# Patient Record
Sex: MALE | Race: WHITE | NOT HISPANIC OR LATINO | URBAN - METROPOLITAN AREA
[De-identification: names, ages, dates, MRNs, and addresses within clinical notes are randomized per-mention and may not be internally consistent; named-entity substitution may affect disease eponyms.]

---

## 2020-03-25 ENCOUNTER — OUTPATIENT (OUTPATIENT)
Dept: OUTPATIENT SERVICES | Facility: HOSPITAL | Age: 25
LOS: 1 days | End: 2020-03-25
Payer: COMMERCIAL

## 2020-03-25 DIAGNOSIS — R13.14 DYSPHAGIA, PHARYNGOESOPHAGEAL PHASE: ICD-10-CM

## 2020-03-25 DIAGNOSIS — Z00.00 ENCOUNTER FOR GENERAL ADULT MEDICAL EXAMINATION WITHOUT ABNORMAL FINDINGS: ICD-10-CM

## 2020-03-25 PROCEDURE — 88312 SPECIAL STAINS GROUP 1: CPT | Mod: 26

## 2020-03-25 PROCEDURE — 88305 TISSUE EXAM BY PATHOLOGIST: CPT

## 2020-03-25 PROCEDURE — 88312 SPECIAL STAINS GROUP 1: CPT

## 2020-03-25 PROCEDURE — C1726: CPT

## 2020-03-25 PROCEDURE — 88305 TISSUE EXAM BY PATHOLOGIST: CPT | Mod: 26

## 2020-03-25 PROCEDURE — 43239 EGD BIOPSY SINGLE/MULTIPLE: CPT

## 2020-03-26 LAB — SURGICAL PATHOLOGY STUDY: SIGNIFICANT CHANGE UP

## 2020-11-26 ENCOUNTER — TRANSCRIPTION ENCOUNTER (OUTPATIENT)
Age: 25
End: 2020-11-26

## 2022-10-13 ENCOUNTER — INPATIENT (INPATIENT)
Facility: HOSPITAL | Age: 27
LOS: 2 days | Discharge: HOME | DRG: 389 | End: 2022-10-16
Attending: COLON & RECTAL SURGERY | Admitting: SURGERY
Payer: COMMERCIAL

## 2022-10-13 VITALS
OXYGEN SATURATION: 98 % | RESPIRATION RATE: 18 BRPM | TEMPERATURE: 97 F | WEIGHT: 130.07 LBS | HEART RATE: 75 BPM | DIASTOLIC BLOOD PRESSURE: 85 MMHG | SYSTOLIC BLOOD PRESSURE: 123 MMHG | HEIGHT: 70 IN

## 2022-10-13 PROCEDURE — 99285 EMERGENCY DEPT VISIT HI MDM: CPT

## 2022-10-13 NOTE — ED ADULT TRIAGE NOTE - CHIEF COMPLAINT QUOTE
C/o abdominal pain with associated NV since yesterday at 1700. Hx abdominal adhesions previously needing NG tube

## 2022-10-14 DIAGNOSIS — Z98.890 OTHER SPECIFIED POSTPROCEDURAL STATES: Chronic | ICD-10-CM

## 2022-10-14 DIAGNOSIS — K56.609 UNSPECIFIED INTESTINAL OBSTRUCTION, UNSPECIFIED AS TO PARTIAL VERSUS COMPLETE OBSTRUCTION: ICD-10-CM

## 2022-10-14 LAB
ALBUMIN SERPL ELPH-MCNC: 4.5 G/DL — SIGNIFICANT CHANGE UP (ref 3.3–5)
ALP SERPL-CCNC: 58 U/L — SIGNIFICANT CHANGE UP (ref 40–120)
ALT FLD-CCNC: 13 U/L — SIGNIFICANT CHANGE UP (ref 10–45)
ANION GAP SERPL CALC-SCNC: 17 MMOL/L — SIGNIFICANT CHANGE UP (ref 5–17)
AST SERPL-CCNC: 13 U/L — SIGNIFICANT CHANGE UP (ref 10–40)
BASE EXCESS BLDV CALC-SCNC: 2.3 MMOL/L — SIGNIFICANT CHANGE UP (ref -2–3)
BASOPHILS # BLD AUTO: 0.04 K/UL — SIGNIFICANT CHANGE UP (ref 0–0.2)
BASOPHILS NFR BLD AUTO: 0.3 % — SIGNIFICANT CHANGE UP (ref 0–2)
BILIRUB SERPL-MCNC: 0.6 MG/DL — SIGNIFICANT CHANGE UP (ref 0.2–1.2)
BUN SERPL-MCNC: 20 MG/DL — SIGNIFICANT CHANGE UP (ref 7–23)
CA-I SERPL-SCNC: 1.22 MMOL/L — SIGNIFICANT CHANGE UP (ref 1.15–1.33)
CALCIUM SERPL-MCNC: 9.6 MG/DL — SIGNIFICANT CHANGE UP (ref 8.4–10.5)
CHLORIDE BLDV-SCNC: 99 MMOL/L — SIGNIFICANT CHANGE UP (ref 96–108)
CHLORIDE SERPL-SCNC: 101 MMOL/L — SIGNIFICANT CHANGE UP (ref 96–108)
CO2 BLDV-SCNC: 28 MMOL/L — HIGH (ref 22–26)
CO2 SERPL-SCNC: 22 MMOL/L — SIGNIFICANT CHANGE UP (ref 22–31)
CREAT SERPL-MCNC: 1.08 MG/DL — SIGNIFICANT CHANGE UP (ref 0.5–1.3)
EGFR: 96 ML/MIN/1.73M2 — SIGNIFICANT CHANGE UP
EOSINOPHIL # BLD AUTO: 0.1 K/UL — SIGNIFICANT CHANGE UP (ref 0–0.5)
EOSINOPHIL NFR BLD AUTO: 0.7 % — SIGNIFICANT CHANGE UP (ref 0–6)
FLUAV AG NPH QL: SIGNIFICANT CHANGE UP
FLUBV AG NPH QL: SIGNIFICANT CHANGE UP
GAS PNL BLDV: 136 MMOL/L — SIGNIFICANT CHANGE UP (ref 136–145)
GAS PNL BLDV: SIGNIFICANT CHANGE UP
GAS PNL BLDV: SIGNIFICANT CHANGE UP
GLUCOSE BLDV-MCNC: 86 MG/DL — SIGNIFICANT CHANGE UP (ref 70–99)
GLUCOSE SERPL-MCNC: 86 MG/DL — SIGNIFICANT CHANGE UP (ref 70–99)
HCO3 BLDV-SCNC: 26 MMOL/L — SIGNIFICANT CHANGE UP (ref 22–29)
HCT VFR BLD CALC: 47.5 % — SIGNIFICANT CHANGE UP (ref 39–50)
HCT VFR BLDA CALC: 48 % — SIGNIFICANT CHANGE UP (ref 39–51)
HGB BLD CALC-MCNC: 15.9 G/DL — SIGNIFICANT CHANGE UP (ref 12.6–17.4)
HGB BLD-MCNC: 15.5 G/DL — SIGNIFICANT CHANGE UP (ref 13–17)
IMM GRANULOCYTES NFR BLD AUTO: 0.4 % — SIGNIFICANT CHANGE UP (ref 0–0.9)
LACTATE BLDV-MCNC: 1.7 MMOL/L — SIGNIFICANT CHANGE UP (ref 0.5–2)
LIDOCAIN IGE QN: 28 U/L — SIGNIFICANT CHANGE UP (ref 7–60)
LYMPHOCYTES # BLD AUTO: 17.8 % — SIGNIFICANT CHANGE UP (ref 13–44)
LYMPHOCYTES # BLD AUTO: 2.47 K/UL — SIGNIFICANT CHANGE UP (ref 1–3.3)
MAGNESIUM SERPL-MCNC: 1.9 MG/DL — SIGNIFICANT CHANGE UP (ref 1.6–2.6)
MCHC RBC-ENTMCNC: 27.2 PG — SIGNIFICANT CHANGE UP (ref 27–34)
MCHC RBC-ENTMCNC: 32.6 GM/DL — SIGNIFICANT CHANGE UP (ref 32–36)
MCV RBC AUTO: 83.3 FL — SIGNIFICANT CHANGE UP (ref 80–100)
MONOCYTES # BLD AUTO: 1.25 K/UL — HIGH (ref 0–0.9)
MONOCYTES NFR BLD AUTO: 9 % — SIGNIFICANT CHANGE UP (ref 2–14)
NEUTROPHILS # BLD AUTO: 9.95 K/UL — HIGH (ref 1.8–7.4)
NEUTROPHILS NFR BLD AUTO: 71.8 % — SIGNIFICANT CHANGE UP (ref 43–77)
NRBC # BLD: 0 /100 WBCS — SIGNIFICANT CHANGE UP (ref 0–0)
PCO2 BLDV: 39 MMHG — LOW (ref 42–55)
PH BLDV: 7.44 — HIGH (ref 7.32–7.43)
PHOSPHATE SERPL-MCNC: 2.4 MG/DL — LOW (ref 2.5–4.5)
PLATELET # BLD AUTO: 376 K/UL — SIGNIFICANT CHANGE UP (ref 150–400)
PO2 BLDV: 26 MMHG — SIGNIFICANT CHANGE UP (ref 25–45)
POTASSIUM BLDV-SCNC: 3.7 MMOL/L — SIGNIFICANT CHANGE UP (ref 3.5–5.1)
POTASSIUM SERPL-MCNC: 4.1 MMOL/L — SIGNIFICANT CHANGE UP (ref 3.5–5.3)
POTASSIUM SERPL-SCNC: 4.1 MMOL/L — SIGNIFICANT CHANGE UP (ref 3.5–5.3)
PROT SERPL-MCNC: 7.2 G/DL — SIGNIFICANT CHANGE UP (ref 6–8.3)
RBC # BLD: 5.7 M/UL — SIGNIFICANT CHANGE UP (ref 4.2–5.8)
RBC # FLD: 13.2 % — SIGNIFICANT CHANGE UP (ref 10.3–14.5)
RSV RNA NPH QL NAA+NON-PROBE: SIGNIFICANT CHANGE UP
SAO2 % BLDV: 39.1 % — LOW (ref 67–88)
SARS-COV-2 RNA SPEC QL NAA+PROBE: SIGNIFICANT CHANGE UP
SODIUM SERPL-SCNC: 140 MMOL/L — SIGNIFICANT CHANGE UP (ref 135–145)
WBC # BLD: 13.86 K/UL — HIGH (ref 3.8–10.5)
WBC # FLD AUTO: 13.86 K/UL — HIGH (ref 3.8–10.5)

## 2022-10-14 PROCEDURE — 71045 X-RAY EXAM CHEST 1 VIEW: CPT | Mod: 26,77

## 2022-10-14 PROCEDURE — 71045 X-RAY EXAM CHEST 1 VIEW: CPT | Mod: 26

## 2022-10-14 PROCEDURE — 74177 CT ABD & PELVIS W/CONTRAST: CPT | Mod: 26,MA

## 2022-10-14 RX ORDER — ACETAMINOPHEN 500 MG
1000 TABLET ORAL ONCE
Refills: 0 | Status: COMPLETED | OUTPATIENT
Start: 2022-10-14 | End: 2022-10-14

## 2022-10-14 RX ORDER — BENZOCAINE AND MENTHOL 5; 1 G/100ML; G/100ML
1 LIQUID ORAL ONCE
Refills: 0 | Status: COMPLETED | OUTPATIENT
Start: 2022-10-14 | End: 2022-10-14

## 2022-10-14 RX ORDER — PANTOPRAZOLE SODIUM 20 MG/1
40 TABLET, DELAYED RELEASE ORAL DAILY
Refills: 0 | Status: DISCONTINUED | OUTPATIENT
Start: 2022-10-14 | End: 2022-10-16

## 2022-10-14 RX ORDER — PANTOPRAZOLE SODIUM 20 MG/1
40 TABLET, DELAYED RELEASE ORAL ONCE
Refills: 0 | Status: COMPLETED | OUTPATIENT
Start: 2022-10-14 | End: 2022-10-14

## 2022-10-14 RX ORDER — MORPHINE SULFATE 50 MG/1
4 CAPSULE, EXTENDED RELEASE ORAL ONCE
Refills: 0 | Status: DISCONTINUED | OUTPATIENT
Start: 2022-10-14 | End: 2022-10-14

## 2022-10-14 RX ORDER — SODIUM CHLORIDE 9 MG/ML
1000 INJECTION INTRAMUSCULAR; INTRAVENOUS; SUBCUTANEOUS ONCE
Refills: 0 | Status: COMPLETED | OUTPATIENT
Start: 2022-10-14 | End: 2022-10-14

## 2022-10-14 RX ORDER — SODIUM CHLORIDE 9 MG/ML
1000 INJECTION INTRAMUSCULAR; INTRAVENOUS; SUBCUTANEOUS
Refills: 0 | Status: DISCONTINUED | OUTPATIENT
Start: 2022-10-14 | End: 2022-10-14

## 2022-10-14 RX ORDER — LANOLIN ALCOHOL/MO/W.PET/CERES
5 CREAM (GRAM) TOPICAL AT BEDTIME
Refills: 0 | Status: DISCONTINUED | OUTPATIENT
Start: 2022-10-14 | End: 2022-10-16

## 2022-10-14 RX ORDER — LIDOCAINE 4 G/100G
10 CREAM TOPICAL ONCE
Refills: 0 | Status: COMPLETED | OUTPATIENT
Start: 2022-10-14 | End: 2022-10-14

## 2022-10-14 RX ORDER — BENZOCAINE AND MENTHOL 5; 1 G/100ML; G/100ML
1 LIQUID ORAL THREE TIMES A DAY
Refills: 0 | Status: DISCONTINUED | OUTPATIENT
Start: 2022-10-14 | End: 2022-10-16

## 2022-10-14 RX ORDER — PANTOPRAZOLE SODIUM 20 MG/1
1 TABLET, DELAYED RELEASE ORAL
Qty: 0 | Refills: 0 | DISCHARGE

## 2022-10-14 RX ORDER — SODIUM CHLORIDE 9 MG/ML
1000 INJECTION, SOLUTION INTRAVENOUS
Refills: 0 | Status: DISCONTINUED | OUTPATIENT
Start: 2022-10-14 | End: 2022-10-15

## 2022-10-14 RX ORDER — ONDANSETRON 8 MG/1
4 TABLET, FILM COATED ORAL ONCE
Refills: 0 | Status: COMPLETED | OUTPATIENT
Start: 2022-10-14 | End: 2022-10-14

## 2022-10-14 RX ORDER — INFLUENZA VIRUS VACCINE 15; 15; 15; 15 UG/.5ML; UG/.5ML; UG/.5ML; UG/.5ML
0.5 SUSPENSION INTRAMUSCULAR ONCE
Refills: 0 | Status: COMPLETED | OUTPATIENT
Start: 2022-10-14 | End: 2022-10-16

## 2022-10-14 RX ORDER — ENOXAPARIN SODIUM 100 MG/ML
40 INJECTION SUBCUTANEOUS EVERY 24 HOURS
Refills: 0 | Status: DISCONTINUED | OUTPATIENT
Start: 2022-10-14 | End: 2022-10-16

## 2022-10-14 RX ADMIN — Medication 400 MILLIGRAM(S): at 20:21

## 2022-10-14 RX ADMIN — LIDOCAINE 10 MILLILITER(S): 4 CREAM TOPICAL at 03:56

## 2022-10-14 RX ADMIN — BENZOCAINE AND MENTHOL 1 LOZENGE: 5; 1 LIQUID ORAL at 22:38

## 2022-10-14 RX ADMIN — MORPHINE SULFATE 4 MILLIGRAM(S): 50 CAPSULE, EXTENDED RELEASE ORAL at 04:12

## 2022-10-14 RX ADMIN — MORPHINE SULFATE 4 MILLIGRAM(S): 50 CAPSULE, EXTENDED RELEASE ORAL at 06:55

## 2022-10-14 RX ADMIN — ONDANSETRON 4 MILLIGRAM(S): 8 TABLET, FILM COATED ORAL at 00:13

## 2022-10-14 RX ADMIN — Medication 63.75 MILLIMOLE(S): at 06:28

## 2022-10-14 RX ADMIN — MORPHINE SULFATE 4 MILLIGRAM(S): 50 CAPSULE, EXTENDED RELEASE ORAL at 03:57

## 2022-10-14 RX ADMIN — MORPHINE SULFATE 4 MILLIGRAM(S): 50 CAPSULE, EXTENDED RELEASE ORAL at 07:45

## 2022-10-14 RX ADMIN — PANTOPRAZOLE SODIUM 40 MILLIGRAM(S): 20 TABLET, DELAYED RELEASE ORAL at 12:00

## 2022-10-14 RX ADMIN — BENZOCAINE AND MENTHOL 1 LOZENGE: 5; 1 LIQUID ORAL at 04:15

## 2022-10-14 RX ADMIN — SODIUM CHLORIDE 1000 MILLILITER(S): 9 INJECTION INTRAMUSCULAR; INTRAVENOUS; SUBCUTANEOUS at 00:13

## 2022-10-14 RX ADMIN — MORPHINE SULFATE 4 MILLIGRAM(S): 50 CAPSULE, EXTENDED RELEASE ORAL at 09:23

## 2022-10-14 RX ADMIN — PANTOPRAZOLE SODIUM 40 MILLIGRAM(S): 20 TABLET, DELAYED RELEASE ORAL at 04:45

## 2022-10-14 RX ADMIN — Medication 5 MILLIGRAM(S): at 22:38

## 2022-10-14 RX ADMIN — Medication 400 MILLIGRAM(S): at 14:22

## 2022-10-14 RX ADMIN — SODIUM CHLORIDE 100 MILLILITER(S): 9 INJECTION, SOLUTION INTRAVENOUS at 20:21

## 2022-10-14 RX ADMIN — MORPHINE SULFATE 4 MILLIGRAM(S): 50 CAPSULE, EXTENDED RELEASE ORAL at 00:21

## 2022-10-14 RX ADMIN — Medication 1000 MILLIGRAM(S): at 20:50

## 2022-10-14 RX ADMIN — SODIUM CHLORIDE 125 MILLILITER(S): 9 INJECTION INTRAMUSCULAR; INTRAVENOUS; SUBCUTANEOUS at 02:07

## 2022-10-14 RX ADMIN — SODIUM CHLORIDE 100 MILLILITER(S): 9 INJECTION, SOLUTION INTRAVENOUS at 14:24

## 2022-10-14 NOTE — PROGRESS NOTE ADULT - ASSESSMENT
27M PMHx of eosinophilic esophagitis on PPI, bicuspid aortic valve, bl inguinal hernia repair in childhood, hx of recurrent SBOs treated non-op presents with N/V and severe abd pain for 2 days found to have SBO.     - NGT/IVF/NPO   - no pain medications before exam  - VTE ppx  - obtain records from [facility]   [id number]

## 2022-10-14 NOTE — CHART NOTE - NSCHARTNOTEFT_GEN_A_CORE
Provider was paged because patient was complaining of pain    On arrival, patient was sitting up in his bed. He was complaining of mild abdominal pain, moderate nasal pain (associated w/ the NG tube), nausea, and that the NG tube might have slipped out.    Physical exam:   General: A&Ox3, NAD  HEENT: mask on, NG tube movement caused pain  Respiratory: unlabored breathing  Abdominal: soft, nondistended, nontender to palpation, no rebound or guarding, NG tube drain with 50cc of green fluid (statlock wasn't locked on tube)      Plan:  - 1g of IV tylenol  - 4mg zofran  - 5mg of melatonin  - CXR to check tube placement    [facility]  [id number]

## 2022-10-14 NOTE — H&P ADULT - HISTORY OF PRESENT ILLNESS
27M PMHx of eosinophilic esophagitis on PPI, bicuspid aortic valve, bl inguinal hernia repair in childhood, hx of recurrent SBOs treated non-op presents with N/V and severe abd pain for 2 days. Pt reports that pain started 2 days ago along with N/V, last time passed gas a day ago and had yesterday. Denies fevers, chills, CP, SOB. Per pt, he has a history of recurrent SBOs  about 3-4 years ago, SBO occured verye 2-3 months and at that time there was a plan for MARY at [place], but pt never followed up. For esophagitis, pt is taking PPI and last EGD had 2 years ago, doesn't follow up with GI on regular basis.     In ED WBC 14, Lactate 1.7 HDS, CT is c/w SBO.  27M PMHx of eosinophilic esophagitis on PPI, bicuspid aortic valve, bl inguinal hernia repair in childhood, hx of recurrent SBOs treated non-op presents with N/V and severe periumbilical abd pain for 2 days. Pt reports that pain started 2 days ago along with N/V, last time passed gas a day ago and had yesterday. Denies fevers, chills, CP, SOB. Per pt, he has a history of recurrent SBOs  about 3-4 years ago, SBO occured every 2-3 months and at that time there was a plan for MARY at [place], but pt never followed up. For esophagitis, pt is taking PPI and last EGD had 2 years ago, doesn't follow up with GI on regular basis.     In ED WBC 14, Lactate 1.7 HDS, CT is c/w SBO.

## 2022-10-14 NOTE — H&P ADULT - ATTENDING COMMENTS
Pt seen and examined with surgery team, agree with above.    Pt with h/o bilateral inguinal hernia repair, eosinophilic esophagitis, bicuspid aortic valve and primary SBO, which resolved spontaneously (at [place]) presents with abdominal pain as noted above. Patient indicated to me that he only had one SBO in the past which resolved without surgery, but has indicated to surgical team that he had multiple recurrent episodes. Will verify and obtain records from [place].    Likely adhesive primary SBO, recurrent:  - Will obtain records from [place], preferably including imaging to compare location of obstruction  - If similar location, after this SBO has resolved, may discuss elective laparoscopic lysis of adhesions, but for now will treat as adhesive SBO. No concerning signs indicating need for emergent surgery this morning when I saw pt. I personally reviewed CT images which showed an SBO with prox SB fecalization in lower anterior mid-abdomen.  - Admit, NPO, IVF, NGT  - Serial abdominal exams  - Assess for resolution  - D/w Dr. Horan - may consider CT enterography when resolves to ensure no mass lesion is present    Eosinophilic esophagitis:  - Home med: Protonix  - Continue

## 2022-10-14 NOTE — H&P ADULT - NSICDXPASTMEDICALHX_GEN_ALL_CORE_FT
PAST MEDICAL HISTORY:  Bicuspid aortic valve     Eosinophilic esophagitis     SBO (small bowel obstruction)

## 2022-10-14 NOTE — ED PROVIDER NOTE - CLINICAL SUMMARY MEDICAL DECISION MAKING FREE TEXT BOX
Zane Hammer (MD): Concern for partial SBO. Will obtain labs, CT scan, give fluids, pain control and reassess.

## 2022-10-14 NOTE — ED ADULT NURSE NOTE - OBJECTIVE STATEMENT
27 y old male with PMH of eosinophilic esophagitis, abdominal adhesion and bicuspid aortic valve presents to the ED from home c/o nausea, vomiting and abdominal pain x 1 day. Pt states abdominal pain is intertmittent and generalized. Pt reports decreased PO intake and decreased flatus. Pt A&O x 4, ambulatory. Respirations nonlabored on RA. Abdomen soft, nondistended and tender on palpation. Skin warm, dry and intact. Bed locked in lowest position, side rails up and call bell in reach.

## 2022-10-14 NOTE — ED PROVIDER NOTE - PROGRESS NOTE DETAILS
Dr. Hammer Note: ct scan showing SBO, surgical consult obtained, appreciate consult, surgical team placed NGT and stable for admission.

## 2022-10-14 NOTE — PATIENT PROFILE ADULT - FALL HARM RISK - UNIVERSAL INTERVENTIONS
Bed in lowest position, wheels locked, appropriate side rails in place/Call bell, personal items and telephone in reach/Instruct patient to call for assistance before getting out of bed or chair/Non-slip footwear when patient is out of bed/[place] to call system/Physically safe environment - no spills, clutter or unnecessary equipment/Purposeful Proactive Rounding/Room/bathroom lighting operational, light cord in reach

## 2022-10-14 NOTE — H&P ADULT - ASSESSMENT
27M PMHx of eosinophilic esophagitis on PPI, bicuspid aortic valve, bl inguinal hernia repair in childhood, hx of recurrent SBOs treated non-op presents with N/V and severe abd pain for 2 days found to have SBO.     - Admit to Dr. Henderson   - NPO/IVF  - NGT   - Pain control   - Protonix   - DVT ppx   - Primary team will obtain records from [place]    D/w dr. Henderson     [facility] [id number]

## 2022-10-14 NOTE — H&P ADULT - NSHPLABSRESULTS_GEN_ALL_CORE
.  LABS:                         15.5   13.86 )-----------( 376      ( 14 Oct 2022 00:26 )             47.5     10-14    140  |  101  |  20  ----------------------------<  86  4.1   |  22  |  1.08    Ca    9.6      14 Oct 2022 00:26  Phos  2.4     10-14  Mg     1.9     10-14    TPro  7.2  /  Alb  4.5  /  TBili  0.6  /  DBili  x   /  AST  13  /  ALT  13  /  AlkPhos  58  10-14    < from: CT Abdomen and Pelvis w/ IV Cont (10.14.22 @ 01:32) >    COMPARISON: None.    CONTRAST/COMPLICATIONS:  IV Contrast: Omnipaque 350  90 cc administered   0 cc discarded  Oral Contrast: NONE  Complications: None reported at time of study completion    PROCEDURE:  CT of the Abdomen and Pelvis was performed.  Sagittal and coronal reformats were performed.    FINDINGS:  LOWER CHEST: Within normal limits.    LIVER: Within normal limits.  BILE DUCTS: Normal caliber.  GALLBLADDER: Within normal limits.  SPLEEN: Within normal limits.  PANCREAS: Within normal limits.  ADRENALS: Within normal limits.  KIDNEYS/URETERS: Symmetric enhancement. No hydronephrosis. Right renal   cyst.    BLADDER: Within normal limits.  REPRODUCTIVE ORGANS: Prostate within normal limits.    BOWEL: Dilated loops of small bowel with fecalization and a transition   point in the anteromedial right hemiabdomen, likely secondary to   adhesion. (2:67, 602:25). Mild interloop mesenteric edema. Appendix is   normal.  PERITONEUM: Numerous subcentimeter mesenteric lymph nodes, nonspecific.   Trace to small amount of abdominal and pelvic ascites.  VESSELS: Within normal limits.  RETROPERITONEUM/LYMPH NODES: No lymphadenopathy.  ABDOMINAL WALL: Within normal limits.  BONES: Within normal limits.    IMPRESSION:    Small bowel obstruction with transition point in the right anteromedial   hemiabdomen likely secondary to adhesions. Mild interloop mesenteric   edema and ascites without definite signs of ischemia at this time.

## 2022-10-14 NOTE — H&P ADULT - NSHPPHYSICALEXAM_GEN_ALL_CORE
GEN: NAD  Res: nonlabored on room air   Abd: soft, mildly distended, tender in right periumbilical area w/ rebound tenderness, no diffuse peritonitis, no guarding   Ext: warm, well perfused GEN: NAD  Eyes: no scleral icterus  HENT: NGT in place with clear output, atraumatic  Res: nonlabored on room air with bilateral chest rise  Abd: soft, mildly distended, tender in right periumbilical area, no diffuse peritonitis, no guarding, well-healed bilateral inguinal hernia incisions  Ext: warm, well perfused  Neuro: A&O x3, GCS 15, moves all extremities  Psych: normal affect  Skin: no rashes or lacerations

## 2022-10-14 NOTE — ED PROVIDER NOTE - OBJECTIVE STATEMENT
27y M here w/ mother and PMHx of eosinophilic esophagitis, bicuspid aortic valve presents to the ED c/o N/V and severe abd pain x2days a/w small amount of flatus this morning only and decreased PO intake x2days. Pain is described as colicky. No fevers, CP, SOB. Pt w/ similar prior episode 1 year ago w/ partial SBO that was resolved w/ a NG tube. Denies smoking, ETOH use.

## 2022-10-15 LAB
ANION GAP SERPL CALC-SCNC: 16 MMOL/L — SIGNIFICANT CHANGE UP (ref 5–17)
BUN SERPL-MCNC: 12 MG/DL — SIGNIFICANT CHANGE UP (ref 7–23)
CALCIUM SERPL-MCNC: 8.9 MG/DL — SIGNIFICANT CHANGE UP (ref 8.4–10.5)
CHLORIDE SERPL-SCNC: 102 MMOL/L — SIGNIFICANT CHANGE UP (ref 96–108)
CO2 SERPL-SCNC: 22 MMOL/L — SIGNIFICANT CHANGE UP (ref 22–31)
CREAT SERPL-MCNC: 0.86 MG/DL — SIGNIFICANT CHANGE UP (ref 0.5–1.3)
EGFR: 122 ML/MIN/1.73M2 — SIGNIFICANT CHANGE UP
GLUCOSE SERPL-MCNC: 60 MG/DL — LOW (ref 70–99)
HCT VFR BLD CALC: 42.3 % — SIGNIFICANT CHANGE UP (ref 39–50)
HGB BLD-MCNC: 13.5 G/DL — SIGNIFICANT CHANGE UP (ref 13–17)
MAGNESIUM SERPL-MCNC: 1.6 MG/DL — SIGNIFICANT CHANGE UP (ref 1.6–2.6)
MCHC RBC-ENTMCNC: 27.2 PG — SIGNIFICANT CHANGE UP (ref 27–34)
MCHC RBC-ENTMCNC: 31.9 GM/DL — LOW (ref 32–36)
MCV RBC AUTO: 85.3 FL — SIGNIFICANT CHANGE UP (ref 80–100)
NRBC # BLD: 0 /100 WBCS — SIGNIFICANT CHANGE UP (ref 0–0)
PHOSPHATE SERPL-MCNC: 3.4 MG/DL — SIGNIFICANT CHANGE UP (ref 2.5–4.5)
PLATELET # BLD AUTO: 307 K/UL — SIGNIFICANT CHANGE UP (ref 150–400)
POTASSIUM SERPL-MCNC: 4 MMOL/L — SIGNIFICANT CHANGE UP (ref 3.5–5.3)
POTASSIUM SERPL-SCNC: 4 MMOL/L — SIGNIFICANT CHANGE UP (ref 3.5–5.3)
RBC # BLD: 4.96 M/UL — SIGNIFICANT CHANGE UP (ref 4.2–5.8)
RBC # FLD: 13.2 % — SIGNIFICANT CHANGE UP (ref 10.3–14.5)
SODIUM SERPL-SCNC: 140 MMOL/L — SIGNIFICANT CHANGE UP (ref 135–145)
WBC # BLD: 8.64 K/UL — SIGNIFICANT CHANGE UP (ref 3.8–10.5)
WBC # FLD AUTO: 8.64 K/UL — SIGNIFICANT CHANGE UP (ref 3.8–10.5)

## 2022-10-15 RX ORDER — ACETAMINOPHEN 500 MG
1000 TABLET ORAL ONCE
Refills: 0 | Status: COMPLETED | OUTPATIENT
Start: 2022-10-15 | End: 2022-10-15

## 2022-10-15 RX ORDER — MAGNESIUM SULFATE 500 MG/ML
2 VIAL (ML) INJECTION ONCE
Refills: 0 | Status: COMPLETED | OUTPATIENT
Start: 2022-10-15 | End: 2022-10-16

## 2022-10-15 RX ADMIN — BENZOCAINE AND MENTHOL 1 LOZENGE: 5; 1 LIQUID ORAL at 15:06

## 2022-10-15 RX ADMIN — Medication 400 MILLIGRAM(S): at 05:53

## 2022-10-15 RX ADMIN — BENZOCAINE AND MENTHOL 1 LOZENGE: 5; 1 LIQUID ORAL at 08:44

## 2022-10-15 RX ADMIN — Medication 1000 MILLIGRAM(S): at 06:23

## 2022-10-15 RX ADMIN — BENZOCAINE AND MENTHOL 1 LOZENGE: 5; 1 LIQUID ORAL at 21:31

## 2022-10-15 RX ADMIN — PANTOPRAZOLE SODIUM 40 MILLIGRAM(S): 20 TABLET, DELAYED RELEASE ORAL at 12:45

## 2022-10-15 NOTE — PROGRESS NOTE ADULT - SUBJECTIVE AND OBJECTIVE BOX
Overnight events:   - Patient reported pain, examination was benign; given IV tylenol    SUBJECTIVE:  Patient was seen and examined on AM rounds.    OBJECTIVE:  Vital Signs Last 24 Hrs  T(C): 36.4 (14 Oct 2022 21:04), Max: 36.9 (14 Oct 2022 14:33)  T(F): 97.5 (14 Oct 2022 21:04), Max: 98.5 (14 Oct 2022 14:33)  HR: 72 (14 Oct 2022 21:04) (61 - 85)  BP: 103/66 (14 Oct 2022 21:04) (103/66 - 120/75)  BP(mean): 79 (14 Oct 2022 14:33) (79 - 79)  RR: 18 (14 Oct 2022 21:04) (15 - 18)  SpO2: 97% (14 Oct 2022 21:04) (97% - 100%)    Parameters below as of 14 Oct 2022 21:04  Patient On (Oxygen Delivery Method): room air          10-14-22 @ 07:01  -  10-15-22 @ 00:52  --------------------------------------------------------  IN: 0 mL / OUT: 150 mL / NET: -150 mL        Physical Examination:  General: A&Ox3, NAD  Respiratory: unlabored breathing  Abdominal: soft, nondistended, nontender to palpation, no rebound or guarding, NG tube drain with clear green fluid  Extremities: WWP, [place]      LABS:                        15.5   13.86 )-----------( 376      ( 14 Oct 2022 00:26 )             47.5       10-14    140  |  101  |  20  ----------------------------<  86  4.1   |  22  |  1.08    Ca    9.6      14 Oct 2022 00:26  Phos  2.4     10-14  Mg     1.9     10-14    TPro  7.2  /  Alb  4.5  /  TBili  0.6  /  DBili  x   /  AST  13  /  ALT  13  /  AlkPhos  58  10-14

## 2022-10-15 NOTE — PROGRESS NOTE ADULT - ASSESSMENT
27M PMHx of eosinophilic esophagitis on PPI, bicuspid aortic valve, bl inguinal hernia repair in childhood, hx of recurrent SBOs treated non-op presents with N/V and severe abd pain for 2 days found to have SBO.     Plan:  - NGT/IVF/NPO   - no pain medications before exam  - VTE ppx  - obtain records from [facility]  [id number]   27M PMHx of eosinophilic esophagitis on PPI, bicuspid aortic valve, bl inguinal hernia repair in childhood, hx of recurrent SBOs treated non-op presents with N/V and severe abd pain for 2 days found to have SBO.     Plan:  - Clears  - D/C planning  - Pt will need SB study such as MR enterography and eventual diagnostic lap given hx of recurrent SBO    [facility]  [id number]   27M PMHx of eosinophilic esophagitis on PPI, bicuspid aortic valve, bl inguinal hernia repair in childhood, hx of recurrent SBOs treated non-op presents with N/V and severe abd pain for 2 days found to have SBO.     Plan:  - Clears  - D/C planning  - Pt will need SB study such as MR enterography if he never had and eventual diagnostic lap given hx of recurrent SBO    [facility]  [id number]   27M PMHx of eosinophilic esophagitis on PPI, bicuspid aortic valve, bl inguinal hernia repair in childhood, hx of recurrent SBOs treated non-op presents with N/V and severe abd pain for 2 days found to have SBO.     Plan:  - Clears, if tolerates today adv for LRD for breakfast rashad  - D/C planning  - Pt will need SB study such as MR enterography if he never had and eventual diagnostic lap given hx of recurrent SBO    [facility]  [id number]

## 2022-10-16 ENCOUNTER — TRANSCRIPTION ENCOUNTER (OUTPATIENT)
Age: 27
End: 2022-10-16

## 2022-10-16 VITALS
TEMPERATURE: 98 F | OXYGEN SATURATION: 96 % | SYSTOLIC BLOOD PRESSURE: 119 MMHG | RESPIRATION RATE: 18 BRPM | HEART RATE: 65 BPM | DIASTOLIC BLOOD PRESSURE: 79 MMHG

## 2022-10-16 LAB
ANION GAP SERPL CALC-SCNC: 13 MMOL/L — SIGNIFICANT CHANGE UP (ref 5–17)
BUN SERPL-MCNC: 8 MG/DL — SIGNIFICANT CHANGE UP (ref 7–23)
CALCIUM SERPL-MCNC: 9.5 MG/DL — SIGNIFICANT CHANGE UP (ref 8.4–10.5)
CHLORIDE SERPL-SCNC: 100 MMOL/L — SIGNIFICANT CHANGE UP (ref 96–108)
CO2 SERPL-SCNC: 26 MMOL/L — SIGNIFICANT CHANGE UP (ref 22–31)
CREAT SERPL-MCNC: 0.91 MG/DL — SIGNIFICANT CHANGE UP (ref 0.5–1.3)
EGFR: 118 ML/MIN/1.73M2 — SIGNIFICANT CHANGE UP
GLUCOSE SERPL-MCNC: 70 MG/DL — SIGNIFICANT CHANGE UP (ref 70–99)
HCT VFR BLD CALC: 42.2 % — SIGNIFICANT CHANGE UP (ref 39–50)
HGB BLD-MCNC: 13.7 G/DL — SIGNIFICANT CHANGE UP (ref 13–17)
MAGNESIUM SERPL-MCNC: 2.6 MG/DL — SIGNIFICANT CHANGE UP (ref 1.6–2.6)
MCHC RBC-ENTMCNC: 27 PG — SIGNIFICANT CHANGE UP (ref 27–34)
MCHC RBC-ENTMCNC: 32.5 GM/DL — SIGNIFICANT CHANGE UP (ref 32–36)
MCV RBC AUTO: 83.1 FL — SIGNIFICANT CHANGE UP (ref 80–100)
NRBC # BLD: 0 /100 WBCS — SIGNIFICANT CHANGE UP (ref 0–0)
PHOSPHATE SERPL-MCNC: 3.4 MG/DL — SIGNIFICANT CHANGE UP (ref 2.5–4.5)
PLATELET # BLD AUTO: 334 K/UL — SIGNIFICANT CHANGE UP (ref 150–400)
POTASSIUM SERPL-MCNC: 3.8 MMOL/L — SIGNIFICANT CHANGE UP (ref 3.5–5.3)
POTASSIUM SERPL-SCNC: 3.8 MMOL/L — SIGNIFICANT CHANGE UP (ref 3.5–5.3)
RBC # BLD: 5.08 M/UL — SIGNIFICANT CHANGE UP (ref 4.2–5.8)
RBC # FLD: 12.8 % — SIGNIFICANT CHANGE UP (ref 10.3–14.5)
SODIUM SERPL-SCNC: 139 MMOL/L — SIGNIFICANT CHANGE UP (ref 135–145)
WBC # BLD: 8.18 K/UL — SIGNIFICANT CHANGE UP (ref 3.8–10.5)
WBC # FLD AUTO: 8.18 K/UL — SIGNIFICANT CHANGE UP (ref 3.8–10.5)

## 2022-10-16 PROCEDURE — 82947 ASSAY GLUCOSE BLOOD QUANT: CPT

## 2022-10-16 PROCEDURE — 83605 ASSAY OF LACTIC ACID: CPT

## 2022-10-16 PROCEDURE — 80048 BASIC METABOLIC PNL TOTAL CA: CPT

## 2022-10-16 PROCEDURE — 96375 TX/PRO/DX INJ NEW DRUG ADDON: CPT

## 2022-10-16 PROCEDURE — 74177 CT ABD & PELVIS W/CONTRAST: CPT | Mod: MA

## 2022-10-16 PROCEDURE — 96376 TX/PRO/DX INJ SAME DRUG ADON: CPT

## 2022-10-16 PROCEDURE — 71045 X-RAY EXAM CHEST 1 VIEW: CPT

## 2022-10-16 PROCEDURE — 90686 IIV4 VACC NO PRSV 0.5 ML IM: CPT

## 2022-10-16 PROCEDURE — 83690 ASSAY OF LIPASE: CPT

## 2022-10-16 PROCEDURE — 85014 HEMATOCRIT: CPT

## 2022-10-16 PROCEDURE — 82435 ASSAY OF BLOOD CHLORIDE: CPT

## 2022-10-16 PROCEDURE — 84132 ASSAY OF SERUM POTASSIUM: CPT

## 2022-10-16 PROCEDURE — 83735 ASSAY OF MAGNESIUM: CPT

## 2022-10-16 PROCEDURE — 80053 COMPREHEN METABOLIC PANEL: CPT

## 2022-10-16 PROCEDURE — 84100 ASSAY OF PHOSPHORUS: CPT

## 2022-10-16 PROCEDURE — 36415 COLL VENOUS BLD VENIPUNCTURE: CPT

## 2022-10-16 PROCEDURE — 85027 COMPLETE CBC AUTOMATED: CPT

## 2022-10-16 PROCEDURE — 87637 SARSCOV2&INF A&B&RSV AMP PRB: CPT

## 2022-10-16 PROCEDURE — 96374 THER/PROPH/DIAG INJ IV PUSH: CPT

## 2022-10-16 PROCEDURE — 82330 ASSAY OF CALCIUM: CPT

## 2022-10-16 PROCEDURE — 85025 COMPLETE CBC W/AUTO DIFF WBC: CPT

## 2022-10-16 PROCEDURE — 82803 BLOOD GASES ANY COMBINATION: CPT

## 2022-10-16 PROCEDURE — 84295 ASSAY OF SERUM SODIUM: CPT

## 2022-10-16 PROCEDURE — 99285 EMERGENCY DEPT VISIT HI MDM: CPT

## 2022-10-16 PROCEDURE — 85018 HEMOGLOBIN: CPT

## 2022-10-16 RX ORDER — POTASSIUM CHLORIDE 20 MEQ
20 PACKET (EA) ORAL ONCE
Refills: 0 | Status: COMPLETED | OUTPATIENT
Start: 2022-10-16 | End: 2022-10-16

## 2022-10-16 RX ADMIN — PANTOPRAZOLE SODIUM 40 MILLIGRAM(S): 20 TABLET, DELAYED RELEASE ORAL at 11:41

## 2022-10-16 RX ADMIN — Medication 25 GRAM(S): at 03:24

## 2022-10-16 RX ADMIN — Medication 20 MILLIEQUIVALENT(S): at 13:16

## 2022-10-16 RX ADMIN — INFLUENZA VIRUS VACCINE 0.5 MILLILITER(S): 15; 15; 15; 15 SUSPENSION INTRAMUSCULAR at 14:14

## 2022-10-16 NOTE — DISCHARGE NOTE PROVIDER - CARE PROVIDER_API CALL
Adolfo Blanco)  ColonRectal Surgery; Surgery  [address], [address]  [place] [address]  Phone: [phone]  Fax: [phone]  Follow Up Time: 1 week

## 2022-10-16 NOTE — DISCHARGE NOTE PROVIDER - NSDCCPCAREPLAN_GEN_ALL_CORE_FT
PRINCIPAL DISCHARGE DIAGNOSIS  Diagnosis: SBO (small bowel obstruction)  Assessment and Plan of Treatment: DIET: Return to your usual diet.  FOLLOW UP:  1. Please follow up with your primary care physician in one week regarding your hospitalization, bring copies of your discharge paperwork.  2. Please follow up with your surgeon, Dr. Blanco in 1 week. Discuss MR enterography study and possible diagnostic laparoscopy.

## 2022-10-16 NOTE — DISCHARGE NOTE NURSING/CASE MANAGEMENT/SOCIAL WORK - NSDCVIVACCINE_GEN_ALL_CORE_FT
No Vaccines Administered. influenza, injectable, quadrivalent, preservative free; 16-Oct-2022 14:14; Kim Miner (RN); [employer]; [id number] (Exp. Date: 30-Jun-2023); IntraMuscular; Deltoid Right.; 0.5 milliLiter(s); VIS (VIS Published: 06-Aug-2021, VIS Presented: 16-Oct-2022);

## 2022-10-16 NOTE — PROGRESS NOTE ADULT - ATTENDING COMMENTS
Doing well.  If tolerates LRD without pain and ongoing bowel function then DC home and follow up office to make sure previous work up complete and to discuss elective diagnostic laparoscopy.  Diet, meds, hospital course and follow up discussed
Patient reports multiple tests done for workup for SBO to r/o Crohn's.  We will obtain previous studies.  If workup complete then elective laparoscopy should be scheduled.  Clears today and if doing well then LRD and home tomorrow if tolerates

## 2022-10-16 NOTE — DISCHARGE NOTE NURSING/CASE MANAGEMENT/SOCIAL WORK - NSDCPEFALRISK_GEN_ALL_CORE
For information on Fall & Injury Prevention, visit: [email or web address] OR  [email or web address] OR  https://www.cdc.gov/steadi/patient.html

## 2022-10-16 NOTE — DISCHARGE NOTE NURSING/CASE MANAGEMENT/SOCIAL WORK - PATIENT PORTAL LINK FT
You can access the FollowMyHealth Patient Portal offered by [facility] by registering at the following website: [email or web address]. By joining [employer]’s FollowMyHealth portal, you will also be able to view your health information using other applications (apps) compatible with our system.

## 2022-10-16 NOTE — PROGRESS NOTE ADULT - SUBJECTIVE AND OBJECTIVE BOX
SURGERY DAILY PROGRESS NOTE:     SUBJECTIVE/ROS: Patient seen at bedside this AM.    24h Events:   - Overnight, no acute events    OBJECTIVE:  Vital Signs Last 24 Hrs  T(C): 36.4 (16 Oct 2022 01:10), Max: 36.8 (15 Oct 2022 22:04)  T(F): 97.5 (16 Oct 2022 01:10), Max: 98.2 (15 Oct 2022 22:04)  HR: 55 (16 Oct 2022 01:10) (55 - 69)  BP: 106/67 (16 Oct 2022 01:10) (102/66 - 122/78)  BP(mean): --  RR: 17 (16 Oct 2022 01:10) (17 - 18)  SpO2: 96% (16 Oct 2022 01:10) (95% - 98%)    Parameters below as of 16 Oct 2022 01:10  Patient On (Oxygen Delivery Method): room air      I&O's Detail    14 Oct 2022 07:01  -  15 Oct 2022 07:00  --------------------------------------------------------  IN:    Lactated Ringers: 1100 mL  Total IN: 1100 mL    OUT:    Nasogastric/Oral tube (mL): 300 mL    Oral Fluid: 0 mL    Voided (mL): 550 mL  Total OUT: 850 mL    Total NET: 250 mL      15 Oct 2022 07:01  -  16 Oct 2022 03:13  --------------------------------------------------------  IN:    Oral Fluid: 1080 mL  Total IN: 1080 mL    OUT:    Voided (mL): 300 mL  Total OUT: 300 mL    Total NET: 780 mL        Daily     Daily   MEDICATIONS  (STANDING):  benzocaine 15 mG/menthol 3.6 mG Lozenge 1 Lozenge Oral three times a day  enoxaparin Injectable 40 milliGRAM(s) SubCutaneous every 24 hours  influenza   Vaccine 0.5 milliLiter(s) IntraMuscular once  magnesium sulfate  IVPB 2 Gram(s) IV Intermittent once  pantoprazole  Injectable 40 milliGRAM(s) IV Push daily    MEDICATIONS  (PRN):  melatonin Liquid 5 milliGRAM(s) Oral at bedtime PRN Sleep      LABS:                        13.5   8.64  )-----------( 307      ( 15 Oct 2022 07:33 )             42.3     10-15    140  |  102  |  12  ----------------------------<  60<L>  4.0   |  22  |  0.86    Ca    8.9      15 Oct 2022 07:29  Phos  3.4     10-15  Mg     1.6     10-15      PHYSICAL EXAM:  Gen: AAOx3, non-toxic  Head: NCAT  HEENT: EOMI, oral mucosa moist, normal conjunctiva  Lung: Breathing on RA, unlabored   Abd: soft, NTND, no guarding.   MSK: no visible deformities  Neuro: No focal sensory or motor deficits   SURGERY DAILY PROGRESS NOTE:     SUBJECTIVE/ROS: Patient seen at bedside this AM. Patient tolerating his CLD. Denies N/V and +gas/+ loose bm.     24h Events:   - Overnight, no acute events    OBJECTIVE:  Vital Signs Last 24 Hrs  T(C): 36.4 (16 Oct 2022 01:10), Max: 36.8 (15 Oct 2022 22:04)  T(F): 97.5 (16 Oct 2022 01:10), Max: 98.2 (15 Oct 2022 22:04)  HR: 55 (16 Oct 2022 01:10) (55 - 69)  BP: 106/67 (16 Oct 2022 01:10) (102/66 - 122/78)  BP(mean): --  RR: 17 (16 Oct 2022 01:10) (17 - 18)  SpO2: 96% (16 Oct 2022 01:10) (95% - 98%)    Parameters below as of 16 Oct 2022 01:10  Patient On (Oxygen Delivery Method): room air      I&O's Detail    14 Oct 2022 07:01  -  15 Oct 2022 07:00  --------------------------------------------------------  IN:    Lactated Ringers: 1100 mL  Total IN: 1100 mL    OUT:    Nasogastric/Oral tube (mL): 300 mL    Oral Fluid: 0 mL    Voided (mL): 550 mL  Total OUT: 850 mL    Total NET: 250 mL      15 Oct 2022 07:01  -  16 Oct 2022 03:13  --------------------------------------------------------  IN:    Oral Fluid: 1080 mL  Total IN: 1080 mL    OUT:    Voided (mL): 300 mL  Total OUT: 300 mL    Total NET: 780 mL        Daily     Daily   MEDICATIONS  (STANDING):  benzocaine 15 mG/menthol 3.6 mG Lozenge 1 Lozenge Oral three times a day  enoxaparin Injectable 40 milliGRAM(s) SubCutaneous every 24 hours  influenza   Vaccine 0.5 milliLiter(s) IntraMuscular once  magnesium sulfate  IVPB 2 Gram(s) IV Intermittent once  pantoprazole  Injectable 40 milliGRAM(s) IV Push daily    MEDICATIONS  (PRN):  melatonin Liquid 5 milliGRAM(s) Oral at bedtime PRN Sleep      LABS:                        13.5   8.64  )-----------( 307      ( 15 Oct 2022 07:33 )             42.3     10-15    140  |  102  |  12  ----------------------------<  60<L>  4.0   |  22  |  0.86    Ca    8.9      15 Oct 2022 07:29  Phos  3.4     10-15  Mg     1.6     10-15      PHYSICAL EXAM:  Gen: AAOx3, non-toxic  Head: NCAT  HEENT: EOMI, oral mucosa moist, normal conjunctiva  Lung: Breathing on RA, unlabored   Abd: soft, NTND, no guarding.   MSK: no visible deformities  Neuro: No focal sensory or motor deficits

## 2022-10-16 NOTE — PROGRESS NOTE ADULT - ASSESSMENT
27M PMHx of eosinophilic esophagitis on PPI, bicuspid aortic valve, bl inguinal hernia repair in childhood, hx of recurrent SBOs treated non-op presents with N/V and severe abd pain CT findings of transition point in R. Anteromedial nan-abdomen c/w SBO    Plan:  - LRD    - D/C today tent  - Pt will need SB study such as MR enterography if he never had and eventual diagnostic lap given hx of recurrent SBO    [facility]  [id number]   27M PMHx of eosinophilic esophagitis on PPI, bicuspid aortic valve, bl inguinal hernia repair in childhood, hx of recurrent SBOs treated non-op presents with N/V and severe abd pain CT findings of transition point in R. Anteromedial nan-abdomen c/w SBO    Plan:  - LRD    - D/C today when tolerating diet  - Pt will need SB study such as MR enterography if he never had and eventual diagnostic lap given hx of recurrent SBO    Green Surgery  [id number]

## 2022-10-16 NOTE — DISCHARGE NOTE PROVIDER - HOSPITAL COURSE
27 year old male with past medical history of eosinophilic esophagitis on PPI, bicuspid aortic valve, bilateral inguinal hernia repair in childhood, history of recurrent SBOs treated non-op, presents with nausea, vomiting and severe abdominal pain. CT finding of transition point in right anteromedial nan-abdomen c/w SBO. Patient was admitted, placed NPO with IV fluids and had an NG tube placed to wall suction for decompression. Patient started to show return of bowel function so his diet was advanced accodingly and the NG tube was removed. As of the date of discharge, the patient has no abdominal pain, is stable, not vomiting, tolerating his diet, and is comfortable with discharge home. He should follow up outpatient for MR enterography and eventual diagnostic laparoscopy.

## 2022-10-27 PROBLEM — K20.0 EOSINOPHILIC ESOPHAGITIS: Chronic | Status: ACTIVE | Noted: 2022-10-14

## 2022-10-27 PROBLEM — Q23.1 CONGENITAL INSUFFICIENCY OF AORTIC VALVE: Chronic | Status: ACTIVE | Noted: 2022-10-14

## 2022-10-27 PROBLEM — K56.609 UNSPECIFIED INTESTINAL OBSTRUCTION, UNSPECIFIED AS TO PARTIAL VERSUS COMPLETE OBSTRUCTION: Chronic | Status: ACTIVE | Noted: 2022-10-14

## 2022-11-10 ENCOUNTER — APPOINTMENT (OUTPATIENT)
Dept: SURGERY | Facility: CLINIC | Age: 27
End: 2022-11-10

## 2022-11-10 VITALS
SYSTOLIC BLOOD PRESSURE: 105 MMHG | TEMPERATURE: 97.5 F | HEIGHT: 70 IN | BODY MASS INDEX: 18.61 KG/M2 | DIASTOLIC BLOOD PRESSURE: 68 MMHG | RESPIRATION RATE: 17 BRPM | HEART RATE: 92 BPM | WEIGHT: 130 LBS | OXYGEN SATURATION: 97 %

## 2022-11-10 DIAGNOSIS — Z83.3 FAMILY HISTORY OF DIABETES MELLITUS: ICD-10-CM

## 2022-11-10 DIAGNOSIS — K56.609 UNSPECIFIED INTESTINAL OBSTRUCTION, UNSPECIFIED AS TO PARTIAL VERSUS COMPLETE OBSTRUCTION: ICD-10-CM

## 2022-11-10 PROCEDURE — 99214 OFFICE O/P EST MOD 30 MIN: CPT

## 2022-11-10 RX ORDER — OMEPRAZOLE 20 MG/1
20 CAPSULE, DELAYED RELEASE ORAL
Refills: 0 | Status: ACTIVE | COMMUNITY

## 2022-11-10 NOTE — ASSESSMENT
[FreeTextEntry1] : I have seen and evaluated patient and I have corroborated all nursing input into this note.  Patient with multiple episodes of small bowel obstruction.  IBD work-up has been negative.  My office will obtain the serology and MRE results.  If those are negative as reported then a diagnostic laparoscopy should be performed.  Indications, risk, benefits, alternatives reviewed including but not limited to bleeding, infection, not finding the source of obstruction, and small bowel resection if source of obstruction appears to be intrinsic to the small bowel.  The patient's mother was present for the conversation and all questions were answered.

## 2022-11-10 NOTE — HISTORY OF PRESENT ILLNESS
[FreeTextEntry1] : Zechariah is a 26 y/o male here for consultation, SBO.\par \par CT A+P on 3/2/8/21 - small bowel obstruction with transition point in the right pelvis. Trace amount of free fluid in the lower abdomen and pelvis. \par \par Hospitalized 10/14/22 - 10/16/22 - Abdominal pain with nausea Dx: SBO. Placed NPO with IV fluids and had an NG tube placed. \par \par CT A+P on 10/14/22 - Small bowel obstruction with transition point in the right anteromedial nan abdomen likely secondary to adhesions. Mild interloop mesenteric edema and ascites without definite signs of ischemia at this time.\par \par Pt started having on-and-off abdominal (near umbilical area)  pain, nausea and vomiting, being unable to got to the BM, no appetite, and chills (but no fevers) since 2021. Usually daily DMs otherwise, formed, denies feeling swollen tissue or bleeding.  Usually appetite is normal. Denies taking anticoagulants. Has also been hospitalized in March 2021 for SBO. Has had about dozen episodes of SBO, has only been hospitalized twice. First episode was in the end of 2020. Last episode was in Oct.  Patient reports negative MRE and IBD serologies.  Those reports are pending.  Small bowel series unremarkable.

## 2022-11-10 NOTE — PHYSICAL EXAM
[Normal Breath Sounds] : Normal breath sounds [Normal Heart Sounds] : normal heart sounds [No Rash or Lesion] : No rash or lesion [Alert] : alert [Oriented to Person] : oriented to person [Oriented to Place] : oriented to place [Oriented to Time] : oriented to time [Calm] : calm [de-identified] : WNL [de-identified] : WNL [de-identified] : IRENAL [de-identified] : WNL [de-identified] : WNL ROM

## 2022-11-26 ENCOUNTER — NON-APPOINTMENT (OUTPATIENT)
Age: 27
End: 2022-11-26

## 2022-11-26 ENCOUNTER — EMERGENCY (EMERGENCY)
Facility: HOSPITAL | Age: 27
LOS: 1 days | Discharge: HOME | End: 2022-11-26
Attending: STUDENT IN AN ORGANIZED HEALTH CARE EDUCATION/TRAINING PROGRAM
Payer: COMMERCIAL

## 2022-11-26 VITALS
TEMPERATURE: 98 F | DIASTOLIC BLOOD PRESSURE: 60 MMHG | SYSTOLIC BLOOD PRESSURE: 108 MMHG | HEART RATE: 78 BPM | RESPIRATION RATE: 19 BRPM | HEIGHT: 70 IN | OXYGEN SATURATION: 96 % | WEIGHT: 130.07 LBS

## 2022-11-26 DIAGNOSIS — Z98.890 OTHER SPECIFIED POSTPROCEDURAL STATES: Chronic | ICD-10-CM

## 2022-11-26 PROCEDURE — 99285 EMERGENCY DEPT VISIT HI MDM: CPT

## 2022-11-26 NOTE — ED ADULT TRIAGE NOTE - CHIEF COMPLAINT QUOTE
C/o abdominal pain since 1300 today. Endorses NV. Denies fevers. PMH abdominal adhesions (followed by Dr. Blanco)

## 2022-11-27 VITALS
DIASTOLIC BLOOD PRESSURE: 70 MMHG | RESPIRATION RATE: 18 BRPM | OXYGEN SATURATION: 97 % | SYSTOLIC BLOOD PRESSURE: 103 MMHG | HEART RATE: 80 BPM | TEMPERATURE: 98 F

## 2022-11-27 LAB
ALBUMIN SERPL ELPH-MCNC: 4.4 G/DL — SIGNIFICANT CHANGE UP (ref 3.3–5)
ALP SERPL-CCNC: 62 U/L — SIGNIFICANT CHANGE UP (ref 40–120)
ALT FLD-CCNC: 14 U/L — SIGNIFICANT CHANGE UP (ref 10–45)
ANION GAP SERPL CALC-SCNC: 13 MMOL/L — SIGNIFICANT CHANGE UP (ref 5–17)
AST SERPL-CCNC: 11 U/L — SIGNIFICANT CHANGE UP (ref 10–40)
BASE EXCESS BLDV CALC-SCNC: 1.4 MMOL/L — SIGNIFICANT CHANGE UP (ref -2–3)
BASOPHILS # BLD AUTO: 0.04 K/UL — SIGNIFICANT CHANGE UP (ref 0–0.2)
BASOPHILS NFR BLD AUTO: 0.2 % — SIGNIFICANT CHANGE UP (ref 0–2)
BILIRUB SERPL-MCNC: 0.6 MG/DL — SIGNIFICANT CHANGE UP (ref 0.2–1.2)
BUN SERPL-MCNC: 20 MG/DL — SIGNIFICANT CHANGE UP (ref 7–23)
CA-I SERPL-SCNC: 1.25 MMOL/L — SIGNIFICANT CHANGE UP (ref 1.15–1.33)
CALCIUM SERPL-MCNC: 10 MG/DL — SIGNIFICANT CHANGE UP (ref 8.4–10.5)
CHLORIDE BLDV-SCNC: 100 MMOL/L — SIGNIFICANT CHANGE UP (ref 96–108)
CHLORIDE SERPL-SCNC: 101 MMOL/L — SIGNIFICANT CHANGE UP (ref 96–108)
CO2 BLDV-SCNC: 29 MMOL/L — HIGH (ref 22–26)
CO2 SERPL-SCNC: 23 MMOL/L — SIGNIFICANT CHANGE UP (ref 22–31)
CREAT SERPL-MCNC: 0.89 MG/DL — SIGNIFICANT CHANGE UP (ref 0.5–1.3)
EGFR: 120 ML/MIN/1.73M2 — SIGNIFICANT CHANGE UP
EOSINOPHIL # BLD AUTO: 0.01 K/UL — SIGNIFICANT CHANGE UP (ref 0–0.5)
EOSINOPHIL NFR BLD AUTO: 0.1 % — SIGNIFICANT CHANGE UP (ref 0–6)
GAS PNL BLDV: 133 MMOL/L — LOW (ref 136–145)
GAS PNL BLDV: SIGNIFICANT CHANGE UP
GAS PNL BLDV: SIGNIFICANT CHANGE UP
GLUCOSE BLDV-MCNC: 108 MG/DL — HIGH (ref 70–99)
GLUCOSE SERPL-MCNC: 104 MG/DL — HIGH (ref 70–99)
HCO3 BLDV-SCNC: 27 MMOL/L — SIGNIFICANT CHANGE UP (ref 22–29)
HCT VFR BLD CALC: 46.3 % — SIGNIFICANT CHANGE UP (ref 39–50)
HCT VFR BLDA CALC: 47 % — SIGNIFICANT CHANGE UP (ref 39–51)
HGB BLD CALC-MCNC: 15.7 G/DL — SIGNIFICANT CHANGE UP (ref 12.6–17.4)
HGB BLD-MCNC: 15 G/DL — SIGNIFICANT CHANGE UP (ref 13–17)
IMM GRANULOCYTES NFR BLD AUTO: 0.6 % — SIGNIFICANT CHANGE UP (ref 0–0.9)
LACTATE BLDV-MCNC: 1.3 MMOL/L — SIGNIFICANT CHANGE UP (ref 0.5–2)
LIDOCAIN IGE QN: 31 U/L — SIGNIFICANT CHANGE UP (ref 7–60)
LYMPHOCYTES # BLD AUTO: 1.4 K/UL — SIGNIFICANT CHANGE UP (ref 1–3.3)
LYMPHOCYTES # BLD AUTO: 7.4 % — LOW (ref 13–44)
MCHC RBC-ENTMCNC: 26.8 PG — LOW (ref 27–34)
MCHC RBC-ENTMCNC: 32.4 GM/DL — SIGNIFICANT CHANGE UP (ref 32–36)
MCV RBC AUTO: 82.8 FL — SIGNIFICANT CHANGE UP (ref 80–100)
MONOCYTES # BLD AUTO: 1 K/UL — HIGH (ref 0–0.9)
MONOCYTES NFR BLD AUTO: 5.3 % — SIGNIFICANT CHANGE UP (ref 2–14)
NEUTROPHILS # BLD AUTO: 16.44 K/UL — HIGH (ref 1.8–7.4)
NEUTROPHILS NFR BLD AUTO: 86.4 % — HIGH (ref 43–77)
NRBC # BLD: 0 /100 WBCS — SIGNIFICANT CHANGE UP (ref 0–0)
PCO2 BLDV: 46 MMHG — SIGNIFICANT CHANGE UP (ref 42–55)
PH BLDV: 7.38 — SIGNIFICANT CHANGE UP (ref 7.32–7.43)
PLATELET # BLD AUTO: 543 K/UL — HIGH (ref 150–400)
PO2 BLDV: 37 MMHG — SIGNIFICANT CHANGE UP (ref 25–45)
POTASSIUM BLDV-SCNC: 4.2 MMOL/L — SIGNIFICANT CHANGE UP (ref 3.5–5.1)
POTASSIUM SERPL-MCNC: 4.2 MMOL/L — SIGNIFICANT CHANGE UP (ref 3.5–5.3)
POTASSIUM SERPL-SCNC: 4.2 MMOL/L — SIGNIFICANT CHANGE UP (ref 3.5–5.3)
PROT SERPL-MCNC: 7.6 G/DL — SIGNIFICANT CHANGE UP (ref 6–8.3)
RBC # BLD: 5.59 M/UL — SIGNIFICANT CHANGE UP (ref 4.2–5.8)
RBC # FLD: 13.1 % — SIGNIFICANT CHANGE UP (ref 10.3–14.5)
SAO2 % BLDV: 61.5 % — LOW (ref 67–88)
SARS-COV-2 RNA SPEC QL NAA+PROBE: SIGNIFICANT CHANGE UP
SODIUM SERPL-SCNC: 137 MMOL/L — SIGNIFICANT CHANGE UP (ref 135–145)
WBC # BLD: 19.01 K/UL — HIGH (ref 3.8–10.5)
WBC # FLD AUTO: 19.01 K/UL — HIGH (ref 3.8–10.5)

## 2022-11-27 PROCEDURE — 86850 RBC ANTIBODY SCREEN: CPT

## 2022-11-27 PROCEDURE — 99285 EMERGENCY DEPT VISIT HI MDM: CPT | Mod: 25

## 2022-11-27 PROCEDURE — 86900 BLOOD TYPING SEROLOGIC ABO: CPT

## 2022-11-27 PROCEDURE — 82330 ASSAY OF CALCIUM: CPT

## 2022-11-27 PROCEDURE — 83690 ASSAY OF LIPASE: CPT

## 2022-11-27 PROCEDURE — 86901 BLOOD TYPING SEROLOGIC RH(D): CPT

## 2022-11-27 PROCEDURE — 36415 COLL VENOUS BLD VENIPUNCTURE: CPT

## 2022-11-27 PROCEDURE — 85025 COMPLETE CBC W/AUTO DIFF WBC: CPT

## 2022-11-27 PROCEDURE — 80053 COMPREHEN METABOLIC PANEL: CPT

## 2022-11-27 PROCEDURE — 82435 ASSAY OF BLOOD CHLORIDE: CPT

## 2022-11-27 PROCEDURE — 85014 HEMATOCRIT: CPT

## 2022-11-27 PROCEDURE — 85018 HEMOGLOBIN: CPT

## 2022-11-27 PROCEDURE — 83605 ASSAY OF LACTIC ACID: CPT

## 2022-11-27 PROCEDURE — 82803 BLOOD GASES ANY COMBINATION: CPT

## 2022-11-27 PROCEDURE — 74177 CT ABD & PELVIS W/CONTRAST: CPT | Mod: MA

## 2022-11-27 PROCEDURE — 74177 CT ABD & PELVIS W/CONTRAST: CPT | Mod: 26,MA

## 2022-11-27 PROCEDURE — 82947 ASSAY GLUCOSE BLOOD QUANT: CPT

## 2022-11-27 PROCEDURE — 93005 ELECTROCARDIOGRAM TRACING: CPT

## 2022-11-27 PROCEDURE — 87635 SARS-COV-2 COVID-19 AMP PRB: CPT

## 2022-11-27 PROCEDURE — 84295 ASSAY OF SERUM SODIUM: CPT

## 2022-11-27 PROCEDURE — 84132 ASSAY OF SERUM POTASSIUM: CPT

## 2022-11-27 RX ORDER — SODIUM CHLORIDE 9 MG/ML
1000 INJECTION INTRAMUSCULAR; INTRAVENOUS; SUBCUTANEOUS ONCE
Refills: 0 | Status: COMPLETED | OUTPATIENT
Start: 2022-11-27 | End: 2022-11-27

## 2022-11-27 RX ADMIN — SODIUM CHLORIDE 1000 MILLILITER(S): 9 INJECTION INTRAMUSCULAR; INTRAVENOUS; SUBCUTANEOUS at 00:53

## 2022-11-27 NOTE — ED PROVIDER NOTE - PATIENT PORTAL LINK FT
You can access the FollowMyHealth Patient Portal offered by [facility] by registering at the following website: [email or web address]. By joining [employer]’s FollowMyHealth portal, you will also be able to view your health information using other applications (apps) compatible with our system.

## 2022-11-27 NOTE — ED PROVIDER NOTE - PROGRESS NOTE DETAILS
Mahamed PGY2  Consulted surgery. Mahamed PGY2  Patient and family would like to leave prior to CT result comes back. Spoke with surgery - they will come down and speak with patient in a few minutes. Pending CT result of abdomen and pelvis. Mahamed PGY2  CTAP showed inflammatory over infectious enteritis, but no SBO. Offered admission for further GI workup however patient declines. He states that he will make appointment with his GI doctor and will prefer to do outpatient workup. Mahamed PGY2  CTAP showed inflammatory over infectious enteritis, but no SBO. Offered admission for further GI workup however patient declines. He states that he will make appointment with his GI doctor and will prefer to do outpatient workup. Will send prescription of augmentin to pharmacy.

## 2022-11-27 NOTE — ED PROVIDER NOTE - PHYSICAL EXAMINATION
General: non-toxic appearing, in no respiratory distress  HEENT: atraumatic, normocephalic; pupils are equal, round and react to light, extraocular movements intact bilaterally without deficits, no conjunctival pallor, mucous membranes moist  Neck: no jugular venous distension, full range of motion  Chest/Lung: clear to auscultation bilaterally, no wheezes/rhonchi/rales  Heart: regular rate and rhythm, no murmur/gallops/rubs  Abdomen: TTP in periumbilical region   Extremities: no lower extremity edema, +2 radial pulses bilaterally, +2 dorsalis pedis pulses bilaterally  Musculoskeletal: full range of motion of all 4 extremities  Nervous System: alert and oriented, no motor deficits or sensory deficits; CNII-XII grossly intact; no focal neurologic deficits  Skin: no rashes/lacerations noted

## 2022-11-27 NOTE — ED PROVIDER NOTE - OBJECTIVE STATEMENT
1pm abdominal pain +nausea/vomiting Patient is a 27 year-old-male with history of eosinophilic esophagitis on PPI, bicuspid aortic valve, BL inguinal hernia repair in childhood, hx of recurrent SBO presents with abdominal pain, nausea and vomiting. Reports that he started having pain in the periumbilical region around 1pm, 9/10, sharp sensation, intermittently improving right now, +nausea, + vomiting, last BM this AM, no flatus.   Surgeon: Dr. Blanco

## 2022-11-27 NOTE — CONSULT NOTE ADULT - SUBJECTIVE AND OBJECTIVE BOX
COLORECTAL SURGERY CONSULT NOTE  --------------------------------------------------------------------------------------------    Patient is a 27y old  Male who presents with a chief complaint of nausea, vomiting, and abdominal pain.     HPI: 27 year old man with history of bilateral inguinal hernia repair as a child and multiple SBO in the past, treated nonoperatively, last October of this year.  Had underwent MRE and IBD serologies as an outpatient as part of IBD workup.  Presents now with 1 day of nausea, 5-6 episodes of abdominal pain, crampy in nature. Had not been passing gas or stool at home, similar to previous SBOs. In ED, vomited x1, and since has improved, pain resolved, began passing gas multiple times. Tolerated some water in the ED well.      ROS: 10-system review is otherwise negative except HPI above.      PAST MEDICAL & SURGICAL HISTORY:  Bicuspid aortic valve  Eosinophilic esophagitis  SBO (small bowel obstruction)  H/O bilateral inguinal hernia repair  open      FAMILY HISTORY:      SOCIAL HISTORY:      ALLERGIES: No Known Allergies      HOME MEDICATIONS:     CURRENT MEDICATIONS  MEDICATIONS (STANDING):   MEDICATIONS (PRN):  --------------------------------------------------------------------------------------------    Vitals:   T(C): 36.5 (11-27-22 @ 05:55), Max: 36.6 (11-27-22 @ 00:37)  HR: 80 (11-27-22 @ 05:55) (67 - 80)  BP: 103/70 (11-27-22 @ 05:55) (103/70 - 109/71)  RR: 18 (11-27-22 @ 05:55) (18 - 19)  SpO2: 97% (11-27-22 @ 05:55) (96% - 97%)  CAPILLARY BLOOD GLUCOSE        CAPILLARY BLOOD GLUCOSE          Height (cm): 177.8 (11-26 @ 22:09)  Weight (kg): 59 (11-26 @ 22:09)  BMI (kg/m2): 18.7 (11-26 @ 22:09)  BSA (m2): 1.74 (11-26 @ 22:09)    PHYSICAL EXAM:   General: NAD, Lying in bed comfortably  Neuro: A+Ox3  HEENT: NC/AT, EOMI  Cardio: RRR  Resp: Good effort, CTA b/l  GI/Abd: Soft, NT/ND, no rebound/guarding, no masses palpated  --------------------------------------------------------------------------------------------    LABS  CBC (11-27 @ 01:06)                              15.0                           19.01<H>  )----------------(  543<H>     86.4<H>% Neutrophils, 7.4<L>% Lymphocytes, ANC: 16.44<H>                              46.3      BMP (11-27 @ 01:06)             137     |  101     |  20    		Ca++ --      Ca 10.0               ---------------------------------( 104<H>		Mg --                 4.2     |  23      |  0.89  			Ph --        LFTs (11-27 @ 01:06)      TPro 7.6 / Alb 4.4 / TBili 0.6 / DBili -- / AST 11 / ALT 14 / AlkPhos 62          VBG (11-27 @ 00:45)     7.38 / 46 / 37 / 27 / 1.4 / 61.5<L>%     Lactate: 1.3    --------------------------------------------------------------------------------------------    MICROBIOLOGY      --------------------------------------------------------------------------------------------    IMAGING

## 2022-11-27 NOTE — ED PROVIDER NOTE - NSFOLLOWUPINSTRUCTIONS_ED_ALL_ED_FT
You were seen in the emergency department for abdominal pain, nausea and vomiting.     Please follow up with your primary care doctor within 48 hours for continuation of care.   Please follow up with Dr. Blanco within a week for further management.     Return to the emergency department if you experience any new/concerning/worsening symptoms such as but not limited to: fever (>100.3F), intractable nausea, vomiting, chest pain, shortness of breath, worsening abdominal pain, urinary/bowel complaints.   ====================== You were seen in the emergency department for abdominal pain, nausea and vomiting.     Please follow up with your primary care doctor within 48 hours for continuation of care.   Please follow up with Dr. Blanco within a week for further management.     Return to the emergency department if you experience any new/concerning/worsening symptoms such as but not limited to: fever (>100.3F), intractable nausea, vomiting, chest pain, shortness of breath, worsening abdominal pain, urinary/bowel complaints.   ======================  Acute Abdominal Pain    WHAT YOU NEED TO KNOW:  The cause of your abdominal pain may not be found. If a cause is found, treatment will depend on what the cause is.    DISCHARGE INSTRUCTIONS:    Seek care immediately if:  - You vomit blood or cannot stop vomiting.  - You have blood in your bowel movement or it looks like tar.  - You have bleeding from your rectum.  - Your abdomen is larger than usual, more painful, and hard.  - You have severe pain in your abdomen.  - You stop passing gas and having bowel movements.  - You feel weak, dizzy, or faint.    Contact your healthcare provider if:  - You have a fever.  - You have new signs and symptoms.  - Your symptoms do not get better with treatment.  - You have questions or concerns about your condition or care.    Medicines may be given to decrease pain, treat an infection, and manage your symptoms. Take your medicine as directed. Call your healthcare provider if you think your medicine is not helping or if you have side effects. Tell him if you are allergic to any medicine. Keep a list of the medicines, vitamins, and herbs you take. Include the amounts, and when and why you take them. Bring the list or the pill bottles to follow-up visits. Carry your medicine list with you in case of an emergency.    Manage your symptoms:  - Apply heat on your abdomen for 20 to 30 minutes every 2 hours for as many days as directed. Heat helps decrease pain and muscle spasms.  - Manage your stress. Stress may cause abdominal pain. Your healthcare provider may recommend relaxation techniques and deep breathing exercises to help decrease your stress. Your healthcare provider may recommend you talk to someone about your stress or anxiety, such as a counselor or a trusted friend. Get plenty of sleep and exercise regularly.  - Limit or do not drink alcohol. Alcohol can make your abdominal pain worse. Ask your healthcare provider if it is safe for you to drink alcohol. Also ask how much is safe for you to drink.  - Do not smoke. Nicotine and other chemicals in cigarettes can damage your esophagus and stomach. Ask your healthcare provider for information if you currently smoke and need help to quit. E-cigarettes or smokeless tobacco still contain nicotine. Talk to your healthcare provider before you use these products.  - Make changes to the food you eat as directed: Do not eat foods that cause abdominal pain or other symptoms. Eat small meals more often.  - Eat more high-fiber foods if you are constipated. High-fiber foods include fruits, vegetables, whole-grain foods, and legumes.  - Do not eat foods that cause gas if you have bloating. Examples include broccoli, cabbage, and cauliflower. Do not drink soda or carbonated drinks, because these may also cause gas.  - Do not eat foods or drinks that contain sorbitol or fructose if you have diarrhea and bloating. Some examples are fruit juices, candy, jelly, and sugar-free gum.  - Do not eat high-fat foods, such as fried foods, cheeseburgers, hot dogs, and desserts.  - Limit or do not drink caffeine. Caffeine may make symptoms, such as heart burn or nausea, worse.  - Drink plenty of liquids to prevent dehydration from diarrhea or vomiting. Ask your healthcare provider how much liquid to drink each day and which liquids are best for you.    Follow up with your healthcare provider as directed: Write down your questions so you remember to ask them during your visits. You were seen in the emergency department for abdominal pain, nausea and vomiting.     Please follow up with your primary care doctor within 48 hours for continuation of care.   Please follow up with Dr. Blanco within a week for further management.   Please follow up with GI doctor within a week for further management for inflammatory over infectious enteritis.     Return to the emergency department if you experience any new/concerning/worsening symptoms such as but not limited to: fever (>100.3F), intractable nausea, vomiting, chest pain, shortness of breath, worsening abdominal pain, urinary/bowel complaints.   ======================  Acute Abdominal Pain    WHAT YOU NEED TO KNOW:  The cause of your abdominal pain may not be found. If a cause is found, treatment will depend on what the cause is.    DISCHARGE INSTRUCTIONS:    Seek care immediately if:  - You vomit blood or cannot stop vomiting.  - You have blood in your bowel movement or it looks like tar.  - You have bleeding from your rectum.  - Your abdomen is larger than usual, more painful, and hard.  - You have severe pain in your abdomen.  - You stop passing gas and having bowel movements.  - You feel weak, dizzy, or faint.    Contact your healthcare provider if:  - You have a fever.  - You have new signs and symptoms.  - Your symptoms do not get better with treatment.  - You have questions or concerns about your condition or care.    Medicines may be given to decrease pain, treat an infection, and manage your symptoms. Take your medicine as directed. Call your healthcare provider if you think your medicine is not helping or if you have side effects. Tell him if you are allergic to any medicine. Keep a list of the medicines, vitamins, and herbs you take. Include the amounts, and when and why you take them. Bring the list or the pill bottles to follow-up visits. Carry your medicine list with you in case of an emergency.    Manage your symptoms:  - Apply heat on your abdomen for 20 to 30 minutes every 2 hours for as many days as directed. Heat helps decrease pain and muscle spasms.  - Manage your stress. Stress may cause abdominal pain. Your healthcare provider may recommend relaxation techniques and deep breathing exercises to help decrease your stress. Your healthcare provider may recommend you talk to someone about your stress or anxiety, such as a counselor or a trusted friend. Get plenty of sleep and exercise regularly.  - Limit or do not drink alcohol. Alcohol can make your abdominal pain worse. Ask your healthcare provider if it is safe for you to drink alcohol. Also ask how much is safe for you to drink.  - Do not smoke. Nicotine and other chemicals in cigarettes can damage your esophagus and stomach. Ask your healthcare provider for information if you currently smoke and need help to quit. E-cigarettes or smokeless tobacco still contain nicotine. Talk to your healthcare provider before you use these products.  - Make changes to the food you eat as directed: Do not eat foods that cause abdominal pain or other symptoms. Eat small meals more often.  - Eat more high-fiber foods if you are constipated. High-fiber foods include fruits, vegetables, whole-grain foods, and legumes.  - Do not eat foods that cause gas if you have bloating. Examples include broccoli, cabbage, and cauliflower. Do not drink soda or carbonated drinks, because these may also cause gas.  - Do not eat foods or drinks that contain sorbitol or fructose if you have diarrhea and bloating. Some examples are fruit juices, candy, jelly, and sugar-free gum.  - Do not eat high-fat foods, such as fried foods, cheeseburgers, hot dogs, and desserts.  - Limit or do not drink caffeine. Caffeine may make symptoms, such as heart burn or nausea, worse.  - Drink plenty of liquids to prevent dehydration from diarrhea or vomiting. Ask your healthcare provider how much liquid to drink each day and which liquids are best for you.    Follow up with your healthcare provider as directed: Write down your questions so you remember to ask them during your visits.

## 2022-11-27 NOTE — ED PROVIDER NOTE - ATTENDING CONTRIBUTION TO CARE
I, Kevyn Mackey, performed a history and physical exam of the patient and discussed their management with the resident and /or advanced care provider. I reviewed the resident and /or ACP's note and agree with the documented findings and plan of care. I was present and available for all procedures.    27-year-old male past medical history of eosinophilic esophagitis, recurrent SBO's with no abdominal adhesions presents with a days worth of periumbilical pain associated with multiple episodes of emesis and decreased bowel movements and flatulence.  Patient returned to her surgeon who told to come to the ED for further evaluation for concerns of SBO.  On exam patient states that the pain has significantly improved and states that he has been able to have a couple episodes of passing gas but still has not had a BM.  Patient abdomen is soft and nondistended with minimal tenderness to palpation.  Given patient's past medical history will obtain basic/belly labs with CT of the abdomen pelvis.  Patient states that presentation very similar to his other episodes of mild SBO's at resolved with NG tube insertion, will assess for patient such as colitis, pancreatitis.

## 2022-11-27 NOTE — CONSULT NOTE ADULT - ASSESSMENT
Assessment: 27 year old man multiple SBO in the past, now with resolved abdominal pain and passing gas, but CT with skip lesions, mesenteric lymphadenopathy, and TI thickening, associated with WBC of 19.     Recs:  - Imaging, labs, and outpatient documentation reviewed  - Recommend medicine admission for WBC/Crohn's workup and GI eval with likely colonoscopy  - No acute surgical intervention indicated at this time   - Case Discussed with Attending Colorectal Surgeon Dr. BHARAT Dacosta MD, PGY2  [facility] Surgery   [id number]

## 2022-11-27 NOTE — ED ADULT NURSE NOTE - OBJECTIVE STATEMENT
Pt is a 27y male who presents to [facility] ED c/o abd pain. Pt endorses the abd pain began @1300 and described it as a 10/10 cramping sensation located in the general lower abdominal region no radiation with multiple episodes of emesis throughout the day, no blood seen in emesis. Pt states he has waves of cramping pain but currently endorses improvement in his pain at a 3/10. Pt states a history of abdominal adhesions and was concerned of similar symptoms so he came to the ED for evaluation. PMH of Bicuspid aortic valve, Eosinophilic esophagitis, SBO (small bowel obstruction) and PSH of bilateral inguinal hernia repair. Pt is A&Ox4 currently denying HA, vision changes, SOB, cough, CP, palpitations, diarrhea/constipation, urinary symptoms, weakness, fever/chills. Pt endorses having the flu a week ago and taking medication for it. Pt ambulates independently at baseline w/o any difficulties.

## 2022-11-27 NOTE — ED PROVIDER NOTE - CLINICAL SUMMARY MEDICAL DECISION MAKING FREE TEXT BOX
Patient is a 27 year-old-male with history of eosinophilic esophagitis on PPI, bicuspid aortic valve, BL inguinal hernia repair in childhood, hx of recurrent SBO presents with abdominal pain, nausea and vomiting. Will r/o SBO. Bloodwork, CTAP. Surgery consult.

## 2022-11-27 NOTE — ED PROVIDER NOTE - NSFOLLOWUPCLINICS_GEN_ALL_ED_FT
Gastroenterology at [facility]  Gastroenterology  [address] [place] [id number]  Phone: [phone]  Fax:

## 2022-11-28 ENCOUNTER — TRANSCRIPTION ENCOUNTER (OUTPATIENT)
Age: 27
End: 2022-11-28

## 2022-12-20 ENCOUNTER — APPOINTMENT (OUTPATIENT)
Dept: MRI IMAGING | Facility: CLINIC | Age: 27
End: 2022-12-20

## 2023-01-10 ENCOUNTER — APPOINTMENT (OUTPATIENT)
Dept: SURGERY | Facility: HOSPITAL | Age: 28
End: 2023-01-10

## 2023-07-17 NOTE — ED ADULT NURSE NOTE - NS ED NURSE LEVEL OF CONSCIOUSNESS SPEECH
Problem: Adult Inpatient Plan of Care  Goal: Plan of Care Review  Outcome: Ongoing, Progressing  Goal: Patient-Specific Goal (Individualized)  Outcome: Ongoing, Progressing  Goal: Absence of Hospital-Acquired Illness or Injury  Outcome: Ongoing, Progressing  Goal: Optimal Comfort and Wellbeing  Outcome: Ongoing, Progressing  Goal: Readiness for Transition of Care  Outcome: Ongoing, Progressing     Problem: Pain Acute  Goal: Acceptable Pain Control and Functional Ability  Outcome: Ongoing, Progressing     Problem: Fall Injury Risk  Goal: Absence of Fall and Fall-Related Injury  Outcome: Ongoing, Progressing     Problem: Skin Injury Risk Increased  Goal: Skin Health and Integrity  Outcome: Ongoing, Progressing     Problem: Infection  Goal: Absence of Infection Signs and Symptoms  Outcome: Ongoing, Progressing     Problem: UTI (Urinary Tract Infection)  Goal: Improved Infection Symptoms  Outcome: Ongoing, Progressing      Speaking Coherently/Age appropriate

## 2023-10-16 NOTE — PATIENT PROFILE ADULT - NSPRESCRALCSCORE_GEN_A_NUR_CAL
1 Hydroxychloroquine Pregnancy And Lactation Text: This medication has been shown to cause fetal harm but it isn't assigned a Pregnancy Risk Category. There are small amounts excreted in breast milk.

## 2023-10-20 NOTE — ED ADULT TRIAGE NOTE - AS HEIGHT TYPE
Detail Level: Zone Initiate Treatment: Patient advised to apply CeraVe sunscreen with spf30 or higher. To be applied daily. Action 3: Continue Show Nu-Derm Line: Yes stated